# Patient Record
(demographics unavailable — no encounter records)

---

## 2024-10-10 NOTE — DISCUSSION/SUMMARY
[de-identified] : We discussed their diagnosis and treatment options at length. We will first attempt conservative treatment with a course of PT and anti-inflammatory medication. The patient was provided with a prescription to work on scapular strengthening and rotator cuff strengthening on the impingement syndrome protocol. We also discussed the possible of a corticosteroid injection in the future in order to help decrease inflammation and pain so that they can perform better therapy.    Follow up in 6 weeks to re-evaluate progress with therapy

## 2024-10-10 NOTE — HISTORY OF PRESENT ILLNESS
[de-identified] : 10/10/2024:  BART HARRIS is a 55 year y/o F here for evaluation of their left shoulder pain  Date of Injury/Onset:  3 weeks ago  Pain: At Rest:8/10 With Activity:10/10 Mechanism of injury: no injury  This is NOT a Work Related Injury being treated under Worker's Compensation. This is NOT an athletic injury occurring associated with an interscholastic or organized sports team. Quality of symptoms: hot to the touch, radiates towards inner part of shoulder blade and elbow, clicking  Improves with: rest  Worse with: lifting it,  Prior treatment: no  Prior Imaging: no  Reports Available For Review Today: Out of work/sport: on disability  School/Sport/Position/Occupation: Personal goal:  Additional Information: pt is currently being treated for the right arm in which she is in physical therapy.   Patient is RHD, PMHx DM (PO), HLD, HTN, RT shoulder Sx ( [facility]) presents to office walking on her own complaining of left shoulder pain x > 1-month, insidious onset, progressively worsening. Reports decreased ROM, clicking, denies N/T. started after rehabbing for rt shoulder.  Pain indicated to anterolateral aspect of shoulder radiating to elbow, 10/10, intensity changing, constant, achy/throbbing.  Worsening certain arm motions, lifting to point affecting quality of life. pain awakes from sleep.  Some relief with rest, nsaids/ice/heat without significant help.

## 2024-10-10 NOTE — IMAGING
[de-identified] : LEFT SHOULDER  Inspection: No swelling. Mild Scapular Protraction.  Palpation: No Tenderness is noted   Range of motion:  , ER 50, @90ER 80, @90IR 40 Strength: Forward Flexion 4/5. Abduction 4/5.  External Rotation 4/5 and Internal Rotation 4/5  Neurological testing: motor and sensor intact distally.  Ligament Stability and Special Tests:   Shoulder apprehension: neg  Shoulder relocation: neg  Obriens test: positive Biceps Active test: neg  Ozuna Labral Shear: neg  Impingement testing: equivocal Dez testing: positive   Cross Body Adduction: positive

## 2024-10-10 NOTE — DATA REVIEWED
[FreeTextEntry1] :  Left X-Ray Examination of the SHOULDER 2 views:  no fractures, subluxations or dislocations.   X-Ray Examination of the SCAPULA 1 or 2 views shows: there is an acromial spur

## 2024-10-10 NOTE — DISCUSSION/SUMMARY
[de-identified] : We discussed their diagnosis and treatment options at length. We will first attempt conservative treatment with a course of PT and anti-inflammatory medication. The patient was provided with a prescription to work on scapular strengthening and rotator cuff strengthening on the impingement syndrome protocol. We also discussed the possible of a corticosteroid injection in the future in order to help decrease inflammation and pain so that they can perform better therapy.    Follow up in 6 weeks to re-evaluate progress with therapy

## 2024-10-10 NOTE — HISTORY OF PRESENT ILLNESS
[de-identified] : 10/10/2024:  BART HARRIS is a 55 year y/o F here for evaluation of their left shoulder pain  Date of Injury/Onset:  3 weeks ago  Pain: At Rest:8/10 With Activity:10/10 Mechanism of injury: no injury  This is NOT a Work Related Injury being treated under Worker's Compensation. This is NOT an athletic injury occurring associated with an interscholastic or organized sports team. Quality of symptoms: hot to the touch, radiates towards inner part of shoulder blade and elbow, clicking  Improves with: rest  Worse with: lifting it,  Prior treatment: no  Prior Imaging: no  Reports Available For Review Today: Out of work/sport: on disability  School/Sport/Position/Occupation: Personal goal:  Additional Information: pt is currently being treated for the right arm in which she is in physical therapy.   Patient is RHD, PMHx DM (PO), HLD, HTN, RT shoulder Sx ( [facility]) presents to office walking on her own complaining of left shoulder pain x > 1-month, insidious onset, progressively worsening. Reports decreased ROM, clicking, denies N/T. started after rehabbing for rt shoulder.  Pain indicated to anterolateral aspect of shoulder radiating to elbow, 10/10, intensity changing, constant, achy/throbbing.  Worsening certain arm motions, lifting to point affecting quality of life. pain awakes from sleep.  Some relief with rest, nsaids/ice/heat without significant help.

## 2024-10-10 NOTE — IMAGING
[de-identified] : LEFT SHOULDER  Inspection: No swelling. Mild Scapular Protraction.  Palpation: No Tenderness is noted   Range of motion:  , ER 50, @90ER 80, @90IR 40 Strength: Forward Flexion 4/5. Abduction 4/5.  External Rotation 4/5 and Internal Rotation 4/5  Neurological testing: motor and sensor intact distally.  Ligament Stability and Special Tests:   Shoulder apprehension: neg  Shoulder relocation: neg  Obriens test: positive Biceps Active test: neg  Ozuna Labral Shear: neg  Impingement testing: equivocal Dez testing: positive   Cross Body Adduction: positive

## 2024-10-24 NOTE — HISTORY OF PRESENT ILLNESS
[de-identified] : 07/08/2024 BART 55 year F is here today for post op visit #1 of right shoulder. Pt had rotator cuff surgery with Dr. Weeks on 6/21/24. She presents today for follow up. Patient is taking acetaminophen. She reports slight improvement in pain, though still reports pain with movement and limited ROM. Patient denies chest pain or fever after surgery, reports some rash on her neck from her sling.   08/05/2024 BART 55 year F is here today for post op visit #2. Patient reports some slightly improvement since last visit, reports some soreness. Patient had been compliant with abduction sling and pendulum exercises. Patient had not been taken pain meds.   09/16/2024: Lucy mack here today for post op visit #3. Patient reports significant improvement with pain since last visit. Patient had been taken ibuprofen as needed. Patient is doing PT.  10/24/2024: Patient is here today to follow up on b/l shoulder pain, L>R.  Patient is doing PT (reports doing well and tolerating it, with good outcome) Patient had been taken Mobic. Patient reports significant improvement with right shoulder.  would like an injection to left shoulder today.

## 2024-10-24 NOTE — IMAGING
[de-identified] : RIGHT SHOULDER  Inspection: incisions c/d/i  Palpation: No Tenderness is noted   Range of motion:  FE: PROM 0-170, ER PROM 0-15, IR PROM 0-30  Strength:    Neurological testing: motor and sensor intact distally.  Ligament Stability and Special Tests:   Shoulder apprehension:   Shoulder relocation:   Obriens test:  Biceps Active test:  Ozuna Labral Shear:   Impingement testing:   Dez testing:  Cross Body Adduction:

## 2024-10-24 NOTE — DATA REVIEWED
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 Infraspinatus tendinopathy and fraying with 3mm low grade articular insertional tear. 2. Supraspinatus tendinopathy and fraying with 10mm x 6mm full-thickness insertional tear and 15 mm articular- sided insertional tear. No muscle atrophy.  3. Capsular thickening more noted anterior which can be seen with adhesive capsulitis.  4. Fraying and tear anterior inferior labrum.  5. Fraying superior labrum.  6. Biceps tendinopathy and tenosynovitis.  7. Mild glenohumeral arthrosis with joint effusion.

## 2024-10-24 NOTE — DISCUSSION/SUMMARY
[de-identified] : POV 1 R RCR BT Continue abdction sling start pendulum exercises RTC 4 weeks  PT rx to be given at next visit *** POV 2 R RCR BT discontinue abduction sling start working on ROM exercises, no strengthening RTC 6 weeks PT rx to be given at next visit OOW note for 6 weeks since pt has to be onsight  next visit: start strengthening and rediscuss return work *** POV 3 R RCR BT continue working on ROM exercises, start progressive strengthening as per protocol RTC 6 weeks PT rx  OOW note for 6 weeks since pt has to be on sight  next visit: continue strengthening and rediscuss return work *** 10/24/24:  R RCR BT L shoulder Subacromial bursitis continue working on ROM exercises, start progressive strengthening as per protocol RTC 6 weeks CSI of left shoulder during visit - alla well   RTW on 10/28/24, light duty with restriction no lifting > 15lbs  next visit: continue strengthening

## 2024-10-24 NOTE — PROCEDURE
[FreeTextEntry3] : Procedure: Kenolog injection of the left subacromial space  Indication:  subacromial bursitis/impingement, pain Risk, benefits and alternatives were discussed with the patient. Potential complications include bleeding and infection.  Alcohol was used to prep the area.  Ethyl chloride spray was used as a topical anesthetic.  Using sterile technique, the injection needle was then directed from a standard posterior approach parrallel to and inferiort to the acromion.  aspect.  The procedure was not ultrasound guided.  A 1.5 inch 21g needle was used to inject 3 mL of 1% Lidocaine, 3 mL 0.25% Bupivacaine and 2 mL 40mg/mL triamcinolone.  No significant reistance was encountered,   A bandage was applied.  The patient tolerated the procedure well.   Within 5 minutes of the injection and before departing the clinic he reported a [60%] reduction in baseline pain. Complications: None.  Patient instructed to avoid strenuous activity for 2 day(s).  Follow-up in the office as needed, in 3 months for repeat injection, if pain remains unresolved, or for further concerns.  Specifically counseled regarding the signs and symptoms of potential infection and instructed to present promptly to clinic or hospital if such signs and symptoms arise.

## 2024-10-24 NOTE — PHYSICAL EXAM
[de-identified] : RIGHT SHOULDER Inspection: incisions c/d/i Palpation: No Tenderness is noted Range of motion: FE: PROM 0-170, ER PROM 0-45, IR PROM 0-30 Strength: Neurological testing: motor and sensor intact distally. Ligament Stability and Special Tests: deferred

## 2024-12-06 NOTE — DISCUSSION/SUMMARY
[de-identified] : POV 1 R RCR BT Continue abdction sling start pendulum exercises RTC 4 weeks  PT rx to be given at next visit *** POV 2 R RCR BT discontinue abduction sling start working on ROM exercises, no strengthening RTC 6 weeks PT rx to be given at next visit OOW note for 6 weeks since pt has to be onsight  next visit: start strengthening and rediscuss return work *** POV 3 R RCR BT continue working on ROM exercises, start progressive strengthening as per protocol RTC 6 weeks PT rx  OOW note for 6 weeks since pt has to be on sight  next visit: continue strengthening and rediscuss return work *** 10/24/24:  R RCR BT L shoulder Subacromial bursitis continue working on ROM exercises, start progressive strengthening as per protocol RTC 6 weeks CSI of left shoulder during visit - alla well   RTW on 10/28/24, light duty with restriction no lifting > 15lbs  next visit: continue strengthening *** 12/6 R RCR BT 6 months  L shoulder Subacromial bursitis resolved continue working on ROM exercises,progressive strengthening as per protocol RTC 12 weeks RTW on 10/28/24, continue light duty with restriction no lifting > 15lbs  next visit: continue strengthening

## 2024-12-06 NOTE — PHYSICAL EXAM
[de-identified] : RIGHT SHOULDER Inspection: incisions c/d/i Palpation: No Tenderness is noted Range of motion: FE: PROM 0-170, ER PROM 0-65, IR PROM 0-60 Strength: 5/5 FE/ER/IR Neurological testing: motor and sensor intact distally. Ligament Stability and Special Tests: negative impingement

## 2024-12-06 NOTE — IMAGING
[de-identified] : RIGHT SHOULDER  Inspection: incisions c/d/i  Palpation: No Tenderness is noted   Range of motion:  FE: PROM 0-170, ER PROM 0-15, IR PROM 0-30  Strength:    Neurological testing: motor and sensor intact distally.  Ligament Stability and Special Tests:   Shoulder apprehension:   Shoulder relocation:   Obriens test:  Biceps Active test:  Ozuna Labral Shear:   Impingement testing:   Dez testing:  Cross Body Adduction:

## 2024-12-06 NOTE — HISTORY OF PRESENT ILLNESS
[de-identified] : 07/08/2024 BART 55 year F is here today for post op visit #1 of right shoulder. Pt had rotator cuff surgery with Dr. Weeks on 6/21/24. She presents today for follow up. Patient is taking acetaminophen. She reports slight improvement in pain, though still reports pain with movement and limited ROM. Patient denies chest pain or fever after surgery, reports some rash on her neck from her sling.   08/05/2024 BART 55 year F is here today for post op visit #2. Patient reports some slightly improvement since last visit, reports some soreness. Patient had been compliant with abduction sling and pendulum exercises. Patient had not been taken pain meds.   09/16/2024: Patie nis here today for post op visit #3. Patient reports significant improvement with pain since last visit. Patient had been taken ibuprofen as needed. Patient is doing PT.  10/24/2024: Patient is here today to follow up on b/l shoulder pain, L>R.  Patient is doing PT (reports doing well and tolerating it, with good outcome) Patient had been taken Mobic. Patient reports significant improvement with right shoulder.  would like an injection to left shoulder today.   12/6/24: Patient is here to follow up on b/l shoulder. Patient reports significant improvement with pain after CSI on left shoulder, right shoulder reports some slightly improvement. Had been taken  Mobic.